# Patient Record
Sex: MALE | Race: WHITE | NOT HISPANIC OR LATINO | Employment: FULL TIME | URBAN - METROPOLITAN AREA
[De-identification: names, ages, dates, MRNs, and addresses within clinical notes are randomized per-mention and may not be internally consistent; named-entity substitution may affect disease eponyms.]

---

## 2023-10-10 ENCOUNTER — TELEPHONE (OUTPATIENT)
Dept: GASTROENTEROLOGY | Facility: CLINIC | Age: 68
End: 2023-10-10
Payer: MEDICARE

## 2024-01-03 ENCOUNTER — ANESTHESIA EVENT (OUTPATIENT)
Dept: GASTROENTEROLOGY | Facility: HOSPITAL | Age: 69
End: 2024-01-03
Payer: MEDICARE

## 2024-01-07 ENCOUNTER — PREP FOR PROCEDURE (OUTPATIENT)
Dept: GASTROENTEROLOGY | Facility: CLINIC | Age: 69
End: 2024-01-07
Payer: MEDICARE

## 2024-01-07 RX ORDER — SODIUM CHLORIDE 9 MG/ML
20 INJECTION, SOLUTION INTRAVENOUS CONTINUOUS
Status: CANCELLED | OUTPATIENT
Start: 2024-01-07

## 2024-01-08 ENCOUNTER — ANESTHESIA (OUTPATIENT)
Dept: GASTROENTEROLOGY | Facility: HOSPITAL | Age: 69
End: 2024-01-08
Payer: MEDICARE

## 2024-01-08 ENCOUNTER — HOSPITAL ENCOUNTER (OUTPATIENT)
Dept: GASTROENTEROLOGY | Facility: HOSPITAL | Age: 69
Discharge: HOME | End: 2024-01-08
Payer: MEDICARE

## 2024-01-08 VITALS
DIASTOLIC BLOOD PRESSURE: 92 MMHG | HEART RATE: 66 BPM | WEIGHT: 136 LBS | OXYGEN SATURATION: 96 % | TEMPERATURE: 97.5 F | RESPIRATION RATE: 16 BRPM | SYSTOLIC BLOOD PRESSURE: 135 MMHG | HEIGHT: 69 IN | BODY MASS INDEX: 20.14 KG/M2

## 2024-01-08 DIAGNOSIS — Z12.11 ENCOUNTER FOR SCREENING FOR MALIGNANT NEOPLASM OF COLON: ICD-10-CM

## 2024-01-08 PROBLEM — I10 HYPERTENSION: Status: ACTIVE | Noted: 2024-01-08

## 2024-01-08 PROBLEM — E78.5 HYPERLIPIDEMIA: Status: ACTIVE | Noted: 2024-01-08

## 2024-01-08 PROBLEM — J44.9 COPD (CHRONIC OBSTRUCTIVE PULMONARY DISEASE) (MULTI): Status: ACTIVE | Noted: 2024-01-08

## 2024-01-08 PROCEDURE — 45380 COLONOSCOPY AND BIOPSY: CPT | Performed by: INTERNAL MEDICINE

## 2024-01-08 PROCEDURE — 88305 TISSUE EXAM BY PATHOLOGIST: CPT | Performed by: PATHOLOGY

## 2024-01-08 PROCEDURE — 3700000001 HC GENERAL ANESTHESIA TIME - INITIAL BASE CHARGE: Performed by: INTERNAL MEDICINE

## 2024-01-08 PROCEDURE — 3700000002 HC GENERAL ANESTHESIA TIME - EACH INCREMENTAL 1 MINUTE: Performed by: INTERNAL MEDICINE

## 2024-01-08 PROCEDURE — 7100000009 HC PHASE TWO TIME - INITIAL BASE CHARGE: Performed by: INTERNAL MEDICINE

## 2024-01-08 PROCEDURE — 2500000004 HC RX 250 GENERAL PHARMACY W/ HCPCS (ALT 636 FOR OP/ED): Performed by: INTERNAL MEDICINE

## 2024-01-08 PROCEDURE — 2500000004 HC RX 250 GENERAL PHARMACY W/ HCPCS (ALT 636 FOR OP/ED)

## 2024-01-08 PROCEDURE — 2500000005 HC RX 250 GENERAL PHARMACY W/O HCPCS

## 2024-01-08 PROCEDURE — 7100000010 HC PHASE TWO TIME - EACH INCREMENTAL 1 MINUTE: Performed by: INTERNAL MEDICINE

## 2024-01-08 PROCEDURE — 0753T DGTZ GLS MCRSCP SLD LEVEL IV: CPT | Mod: TC,SUR,PORLAB,MUE | Performed by: INTERNAL MEDICINE

## 2024-01-08 RX ORDER — LISINOPRIL AND HYDROCHLOROTHIAZIDE 20; 25 MG/1; MG/1
1 TABLET ORAL DAILY
COMMUNITY

## 2024-01-08 RX ORDER — CALCIUM CARBONATE 300MG(750)
400 TABLET,CHEWABLE ORAL DAILY
COMMUNITY
Start: 2021-03-23

## 2024-01-08 RX ORDER — LIDOCAINE HYDROCHLORIDE 20 MG/ML
INJECTION, SOLUTION EPIDURAL; INFILTRATION; INTRACAUDAL; PERINEURAL AS NEEDED
Status: DISCONTINUED | OUTPATIENT
Start: 2024-01-08 | End: 2024-01-08

## 2024-01-08 RX ORDER — PROPOFOL 10 MG/ML
INJECTION, EMULSION INTRAVENOUS AS NEEDED
Status: DISCONTINUED | OUTPATIENT
Start: 2024-01-08 | End: 2024-01-08

## 2024-01-08 RX ORDER — ATORVASTATIN CALCIUM 20 MG/1
20 TABLET, FILM COATED ORAL DAILY
COMMUNITY

## 2024-01-08 RX ORDER — SODIUM CHLORIDE 9 MG/ML
20 INJECTION, SOLUTION INTRAVENOUS CONTINUOUS
Status: DISCONTINUED | OUTPATIENT
Start: 2024-01-08 | End: 2024-01-09 | Stop reason: HOSPADM

## 2024-01-08 RX ADMIN — LIDOCAINE HYDROCHLORIDE 40 MG: 20 INJECTION, SOLUTION EPIDURAL; INFILTRATION; INTRACAUDAL; PERINEURAL at 08:40

## 2024-01-08 RX ADMIN — SODIUM CHLORIDE 20 ML/HR: 9 INJECTION, SOLUTION INTRAVENOUS at 08:05

## 2024-01-08 RX ADMIN — PROPOFOL 200 MG: 10 INJECTION, EMULSION INTRAVENOUS at 08:40

## 2024-01-08 SDOH — HEALTH STABILITY: MENTAL HEALTH: CURRENT SMOKER: 1

## 2024-01-08 ASSESSMENT — PAIN SCALES - GENERAL
PAINLEVEL_OUTOF10: 0 - NO PAIN
PAIN_LEVEL: 0
PAINLEVEL_OUTOF10: 0 - NO PAIN

## 2024-01-08 ASSESSMENT — PAIN - FUNCTIONAL ASSESSMENT
PAIN_FUNCTIONAL_ASSESSMENT: 0-10

## 2024-01-08 ASSESSMENT — COLUMBIA-SUICIDE SEVERITY RATING SCALE - C-SSRS
2. HAVE YOU ACTUALLY HAD ANY THOUGHTS OF KILLING YOURSELF?: NO
1. IN THE PAST MONTH, HAVE YOU WISHED YOU WERE DEAD OR WISHED YOU COULD GO TO SLEEP AND NOT WAKE UP?: NO
6. HAVE YOU EVER DONE ANYTHING, STARTED TO DO ANYTHING, OR PREPARED TO DO ANYTHING TO END YOUR LIFE?: NO

## 2024-01-08 NOTE — ANESTHESIA PREPROCEDURE EVALUATION
Patient: Finesse Banks    Procedure Information       Date/Time: 01/08/24 0830    Scheduled providers: Robbie Valderrama MD    Procedure: COLONOSCOPY    Location: [facility]            Relevant Problems   Anesthesia (within normal limits)      Cardiovascular   (+) Hyperlipidemia   (+) Hypertension      Endocrine (within normal limits)      GI (within normal limits)      /Renal (within normal limits)      Neuro/Psych (within normal limits)      Pulmonary   (+) COPD (chronic obstructive pulmonary disease) (CMS/HCC)      GI/Hepatic (within normal limits)      Hematology (within normal limits)      Musculoskeletal (within normal limits)      Eyes, Ears, Nose, and Throat (within normal limits)      Infectious Disease (within normal limits)       Clinical information reviewed:   Tobacco  Allergies  Meds   Med Hx  Surg Hx   Fam Hx  Soc Hx        NPO Detail:  NPO/Void Status  Carbohydrate Drink Given Prior to Surgery? : N  Date of Last Liquid: 01/08/24  Time of Last Liquid: 0500  Date of Last Solid: 01/06/24  Last Intake Type: GI prep         Physical Exam    Airway  Mallampati: III  TM distance: >3 FB  Neck ROM: full     Cardiovascular   Rhythm: regular  Rate: normal     Dental        Pulmonary   (+) decreased breath sounds     Abdominal - normal exam             Anesthesia Plan    ASA 2     MAC     The patient is a current smoker.  Patient did not smoke on day of procedure.    intravenous induction   Anesthetic plan and risks discussed with patient.    Plan discussed with CRNA.

## 2024-01-08 NOTE — ANESTHESIA POSTPROCEDURE EVALUATION
Patient: Finesse Banks    Procedure Summary       Date: 01/08/24 Room / Location: [facility]    Anesthesia Start: 0833 Anesthesia Stop: 0906    Procedure: COLONOSCOPY Diagnosis: Encounter for screening for malignant neoplasm of colon    Scheduled Providers: Robbie Valderrama MD Responsible Provider: JAMES Markham    Anesthesia Type: MAC ASA Status: 2            Anesthesia Type: MAC    Vitals Value Taken Time   /92 01/08/24 0925   Temp 36.4 °C (97.5 °F) 01/08/24 0925   Pulse 66 01/08/24 0925   Resp 16 01/08/24 0925   SpO2 96 % 01/08/24 0925       Anesthesia Post Evaluation    Patient location during evaluation: PACU  Patient participation: complete - patient participated  Level of consciousness: awake and alert  Pain score: 0  Pain management: adequate  Airway patency: patent  Cardiovascular status: acceptable  Respiratory status: acceptable  Hydration status: acceptable  Postoperative Nausea and Vomiting: none        No notable events documented.

## 2024-01-08 NOTE — LETTER
"January 11, 2024     Finesse Banks  [address] [place] [address]      Dear Mr. Banks:    Below are the results from your recent visit:          One polyp that I removed during your recent colonoscopy was a tubular adenoma on pathology.  This type of polyp is not a cancer, but it is the type of polyp that could grow into a cancer over time.  Any polyps that were removed will not grow into a cancer, but having polyps like this can increase your risk of developing other polyps or eventually a cancer.  Because of that risk I would recommend that you have another colonoscopy in about 5 years to look for other polyps.        The other polyps that I removed during your recent colonoscopy were hyperplastic polyps. These polyps are benign (not cancerous) and they are not considered \"pre-cancerous\" either. They do not appear to increase the risk of colon cancer.        If you have any other questions or concerns please do not hesitate to call me at my office at [phone].        Repeat Colonoscopy Interval: 5 years      Sincerely,        Dr. Robbie Valderrama          Resulted Orders   Surgical Pathology Exam   Result Value Ref Range    Case Report       Surgical Pathology                                Case: [id number]                                  Authorizing Provider:  Robbie Valderrama MD        Collected:           01/08/2024 0854              Ordering Location:     [facility]    Received:            01/08/2024 1027                                     Building                                                                     Pathologist:           Yaya Rosales MD                                                       Specimens:   A) - COLON - TRANSVERSE POLYP                                                                       B) - RECTUM POLYPECTOMY, multiple                                                          FINAL DIAGNOSIS       A.  Transverse colon polyp:  Tubular adenoma    B. " " Rectum polyp:  Hyperplastic polyp              By the signature on this report, the individual or group listed as making the Final Interpretation/Diagnosis certifies that they have reviewed this case.       Gross Description       A: Received in formalin, labeled with the patient's name and hospital number and \"transverse colon\", is a fragment of tan, soft tissue measuring 0.4 x 0.2 x 0.2 cm.  The specimen is submitted in toto in one cassette.  SBS  B: Received in formalin, labeled with the patient's name and hospital number and \"rectum polyp\", are multiple fragments of tan, soft tissue aggregating to 0.7 x 0.3 x 0.2 cm. The specimen is submitted in toto in one cassette.  SBS         "

## 2024-01-08 NOTE — PRE-SEDATION DOCUMENTATION
Patient: Finesse Banks  MRN: [id number]    Pre-sedation Evaluation:  Sedation necessary for: Analgesia  Requesting service: GI    History of Present Illness:     Finesse Banks is a 68 y.o. male with a history of HTN, COPD, HLD, and tobacco use who presents for OPEN ACCESS colonoscopy requested by his PCP to evaluate a positive cologuard.    He has never had a colonoscopy before. He says that his twin sister had colon cancer diagnosed in her 40s.         Past Medical History:   Diagnosis Date    COPD (chronic obstructive pulmonary disease) (CMS/[facility])     Hyperlipidemia     Hypertension        Principle problems:  Patient Active Problem List    Diagnosis Date Noted    COPD (chronic obstructive pulmonary disease) (CMS/[facility]) 01/08/2024    Hyperlipidemia 01/08/2024    Hypertension 01/08/2024     Allergies:  No Known Allergies  PTA/Current Medications:  (Not in a hospital admission)    Current Outpatient Medications   Medication Sig Dispense Refill    atorvastatin (Lipitor) 20 mg tablet Take 1 tablet (20 mg) by mouth once daily.      lisinopriL-hydrochlorothiazide 20-25 mg tablet Take 1 tablet by mouth once daily.      magnesium oxide (Mag-Ox) 400 mg tablet Take 1 tablet (400 mg) by mouth once daily.       Current Facility-Administered Medications   Medication Dose Route Frequency Provider Last Rate Last Admin    sodium chloride 0.9% infusion  20 mL/hr intravenous Continuous Robbie Valderrama MD 20 mL/hr at 01/08/24 0814 Continued by Anesthesia at 01/08/24 0814     Past Surgical History:   has a past surgical history that includes Other surgical history (02/17/2021) and Other surgical history (03/02/2021).    Recent sedation/surgery (24 hours) No    Review of Systems:  Please check all that apply: Asthma        NPO guidelines met: Yes    Physical Exam    Airway  Mallampati: II  Neck ROM: full  Comments: Normal mouth opening.   Cardiovascular   Rhythm: regular  Rate: normal  (-) murmur     Dental    Pulmonary   Breath  sounds clear to auscultation  (-) wheezes       Other findings: Abdomen is soft, nontender, and not distended.    Patient is calm appearing and in no apparent distress.    Patient is awake and alert and oriented x4.      Plan    ASA 2     Moderate   (Sedation medications to be delivered via monitored anesthesia care (MAC).    This evaluation serves as my H&P.    Outpatient medication list and allergies have been reviewed.  Pre-procedure/mellisa procedure antibiotics not needed.    Pre-procedure evaluation completed by physician.    Surgeon has reviewed key risks related to the risk of jesse COVID-19 and if they contract COVID-19 what the risks are.)

## 2024-01-09 NOTE — ADDENDUM NOTE
Encounter addended by: Portia Travis RN on: 1/9/2024 11:01 AM   Actions taken: Contacts section saved, Flowsheet accepted

## 2024-01-11 LAB
LABORATORY COMMENT REPORT: NORMAL
PATH REPORT.FINAL DX SPEC: NORMAL
PATH REPORT.GROSS SPEC: NORMAL
PATH REPORT.TOTAL CANCER: NORMAL

## 2024-03-14 ENCOUNTER — APPOINTMENT (OUTPATIENT)
Dept: UROLOGY | Facility: CLINIC | Age: 69
End: 2024-03-14
Payer: MEDICARE

## 2024-03-25 ENCOUNTER — OFFICE VISIT (OUTPATIENT)
Dept: UROLOGY | Facility: CLINIC | Age: 69
End: 2024-03-25
Payer: MEDICARE

## 2024-03-25 DIAGNOSIS — R97.20 ELEVATED PSA: ICD-10-CM

## 2024-03-25 DIAGNOSIS — N40.2 PROSTATE NODULE: ICD-10-CM

## 2024-03-25 DIAGNOSIS — N40.1 BENIGN PROSTATIC HYPERPLASIA WITH LOWER URINARY TRACT SYMPTOMS, SYMPTOM DETAILS UNSPECIFIED: Primary | ICD-10-CM

## 2024-03-25 LAB
POC BILIRUBIN, URINE: NEGATIVE
POC BLOOD, URINE: NEGATIVE
POC GLUCOSE, URINE: NEGATIVE MG/DL
POC KETONES, URINE: NEGATIVE MG/DL
POC LEUKOCYTES, URINE: NEGATIVE
POC NITRITE,URINE: NEGATIVE
POC PH, URINE: 6.5 PH
POC PROTEIN, URINE: NEGATIVE MG/DL
POC SPECIFIC GRAVITY, URINE: 1.01
POC UROBILINOGEN, URINE: 0.2 EU/DL

## 2024-03-25 PROCEDURE — 1159F MED LIST DOCD IN RCRD: CPT | Performed by: UROLOGY

## 2024-03-25 PROCEDURE — 99204 OFFICE O/P NEW MOD 45 MIN: CPT | Performed by: UROLOGY

## 2024-03-25 PROCEDURE — 81003 URINALYSIS AUTO W/O SCOPE: CPT | Performed by: UROLOGY

## 2024-03-25 NOTE — PROGRESS NOTES
03/25/2024  68-year-old gentleman presents an elevated PSA 26.7 but not dated.  Voiding well    Patient has no nausea, no vomiting, no fever.    Exam: Circumcised, normal penis normal testes    CLEMENT: 2+, 1 cm lateral right nodule    We discussed elevated PSA, repeat PSA  We discussed prostate nodule, possible prostate biopsy.  We discussed benign prostate hypertrophy with mild voiding symptom  All the questions were answered, the patient expressed understanding and agreed to the plan.    Impression  Elevated PSA  Prostate nodule  BPH    Plan  Watch voiding  Repeat PSA now  Appointment in 2 weeks  Possible prostate biopsy.    Chief Complaint   Patient presents with    Elevated PSA     New patient here for elevated PSA. FH: sister had breast and colon cancer. He denies hx of UTI and prostatitis. Voiding well.         Physical Exam     TODAYS LAB RESULTS:    PSA No date given  26.7    POC Glucose, Urine  NEGATIVE mg/dl NEGATIVE   POC Bilirubin, Urine  NEGATIVE NEGATIVE   POC Ketones, Urine  NEGATIVE mg/dl NEGATIVE   POC Specific Gravity, Urine  1.005 - 1.035 1.015   POC Blood, Urine  NEGATIVE NEGATIVE   POC PH, Urine  No Reference Range Established PH 6.5   POC Protein, Urine  NEGATIVE, 30 (1+) mg/dl NEGATIVE   POC Urobilinogen, Urine  0.2, 1.0 EU/DL 0.2   Poc Nitrite, Urine  NEGATIVE NEGATIVE   POC Leukocytes, Urine  NEGATIVE NEGATIVE       ASSESSMENT&PLAN:      IMPRESSIONS:     PSA   No date given  26.7

## 2024-04-18 ENCOUNTER — OFFICE VISIT (OUTPATIENT)
Dept: UROLOGY | Facility: CLINIC | Age: 69
End: 2024-04-18
Payer: MEDICARE

## 2024-04-18 ENCOUNTER — APPOINTMENT (OUTPATIENT)
Dept: LAB | Facility: LAB | Age: 69
End: 2024-04-18
Payer: MEDICARE

## 2024-04-18 ENCOUNTER — LAB (OUTPATIENT)
Dept: LAB | Facility: LAB | Age: 69
End: 2024-04-18
Payer: MEDICARE

## 2024-04-18 DIAGNOSIS — N40.1 BENIGN PROSTATIC HYPERPLASIA WITH LOWER URINARY TRACT SYMPTOMS, SYMPTOM DETAILS UNSPECIFIED: ICD-10-CM

## 2024-04-18 DIAGNOSIS — N40.2 PROSTATE NODULE: ICD-10-CM

## 2024-04-18 DIAGNOSIS — R97.20 ELEVATED PSA: ICD-10-CM

## 2024-04-18 DIAGNOSIS — R97.20 ELEVATED PSA: Primary | ICD-10-CM

## 2024-04-18 LAB — PSA SERPL-MCNC: 31.2 NG/ML

## 2024-04-18 PROCEDURE — 1159F MED LIST DOCD IN RCRD: CPT | Performed by: UROLOGY

## 2024-04-18 PROCEDURE — 99024 POSTOP FOLLOW-UP VISIT: CPT | Performed by: UROLOGY

## 2024-04-18 PROCEDURE — 36415 COLL VENOUS BLD VENIPUNCTURE: CPT

## 2024-04-18 PROCEDURE — 84153 ASSAY OF PSA TOTAL: CPT

## 2024-04-18 NOTE — PROGRESS NOTES
04/18/2024  Patient did not get PSA    Plan:   PSA today   appointment next week  Cancel today's appointment    Chief Complaint   Patient presents with    Elevated PSA     Patient here today for follow up PSA.  PSA    3/25/2024   26.7  Patient did  not have his PSA  blood work done, he was under the impression that he would have his blood work done today at the office.          Physical Exam     TODAYS LAB RESULTS:      ASSESSMENT&PLAN:      IMPRESSIONS:      03/25/2024  68-year-old gentleman presents an elevated PSA 26.7 but not dated.  Voiding well     Patient has no nausea, no vomiting, no fever.     Exam: Circumcised, normal penis normal testes     CLEMENT: 2+, 1 cm lateral right nodule     We discussed elevated PSA, repeat PSA  We discussed prostate nodule, possible prostate biopsy.  We discussed benign prostate hypertrophy with mild voiding symptom  All the questions were answered, the patient expressed understanding and agreed to the plan.     Impression  Elevated PSA  Prostate nodule  BPH     Plan  Watch voiding  Repeat PSA now  Appointment in 2 weeks  Possible prostate biopsy.          Chief Complaint   Patient presents with    Elevated PSA       New patient here for elevated PSA. FH: sister had breast and colon cancer. He denies hx of UTI and prostatitis. Voiding well.          Physical Exam      TODAYS LAB RESULTS:     PSA No date given  26.7     POC Glucose, Urine  NEGATIVE mg/dl NEGATIVE   POC Bilirubin, Urine  NEGATIVE NEGATIVE   POC Ketones, Urine  NEGATIVE mg/dl NEGATIVE   POC Specific Gravity, Urine  1.005 - 1.035 1.015   POC Blood, Urine  NEGATIVE NEGATIVE   POC PH, Urine  No Reference Range Established PH 6.5   POC Protein, Urine  NEGATIVE, 30 (1+) mg/dl NEGATIVE   POC Urobilinogen, Urine  0.2, 1.0 EU/DL 0.2   Poc Nitrite, Urine  NEGATIVE NEGATIVE   POC Leukocytes, Urine  NEGATIVE NEGATIVE         ASSESSMENT&PLAN:        IMPRESSIONS:      PSA   No date given  26.7

## 2024-05-06 ENCOUNTER — OFFICE VISIT (OUTPATIENT)
Dept: UROLOGY | Facility: CLINIC | Age: 69
End: 2024-05-06
Payer: MEDICARE

## 2024-05-06 DIAGNOSIS — R97.20 ELEVATED PSA: Primary | ICD-10-CM

## 2024-05-06 DIAGNOSIS — N40.0 BENIGN PROSTATIC HYPERPLASIA, UNSPECIFIED WHETHER LOWER URINARY TRACT SYMPTOMS PRESENT: ICD-10-CM

## 2024-05-06 LAB
POC BILIRUBIN, URINE: NEGATIVE
POC BLOOD, URINE: NEGATIVE
POC GLUCOSE, URINE: NEGATIVE MG/DL
POC KETONES, URINE: ABNORMAL MG/DL
POC LEUKOCYTES, URINE: NEGATIVE
POC NITRITE,URINE: NEGATIVE
POC PH, URINE: 7 PH
POC PROTEIN, URINE: NEGATIVE MG/DL
POC SPECIFIC GRAVITY, URINE: 1.02
POC UROBILINOGEN, URINE: 0.2 EU/DL

## 2024-05-06 PROCEDURE — 81002 URINALYSIS NONAUTO W/O SCOPE: CPT | Performed by: UROLOGY

## 2024-05-06 PROCEDURE — 1159F MED LIST DOCD IN RCRD: CPT | Performed by: UROLOGY

## 2024-05-06 PROCEDURE — 99214 OFFICE O/P EST MOD 30 MIN: CPT | Performed by: UROLOGY

## 2024-05-06 RX ORDER — CIPROFLOXACIN 500 MG/1
500 TABLET ORAL 2 TIMES DAILY
Qty: 6 TABLET | Refills: 0 | Status: SHIPPED | OUTPATIENT
Start: 2024-05-06 | End: 2024-05-09

## 2024-05-06 NOTE — PROGRESS NOTES
05/06/2024  Further rising PSA 31.2    We talked about the meaning of PSA elevation, possible prostate cancer, we discussed transrectal ultrasound-guided prostate biopsy. The risk and the benefits were discussed. Patient agreed to proceed a biopsy.    Impression  Elevated PSA  Prostate nodule  BPH    Plan:  Transrectal ultrasound-guided prostate biopsy;  Cipro 500 mg twice a day ×3 days started day before biopsy;  Fleets enema used 2 hours before biopsy;  Return to office 2 weeks after biopsy    Chief Complaint   Patient presents with    Benign Prostatic Hypertrophy     Patient here for a follow up due to BPH and Elevated PSA.    PSA 4/18/2024  31.20    Last Visit Plan:  Watch voiding  Repeat PSA now  Appointment in 2 weeks  Possible prostate biopsy.          Physical Exam     TODAYS LAB RESULTS:    Component  Ref Range & Units 09:45 1 mo ago   POC Glucose, Urine  NEGATIVE mg/dl NEGATIVE NEGATIVE   POC Bilirubin, Urine  NEGATIVE NEGATIVE NEGATIVE   POC Ketones, Urine  NEGATIVE mg/dl TRACE Abnormal  NEGATIVE   POC Specific Gravity, Urine  1.005 - 1.035 1.020 1.015   POC Blood, Urine  NEGATIVE NEGATIVE NEGATIVE   POC PH, Urine  No Reference Range Established PH 7.0 6.5   POC Protein, Urine  NEGATIVE, 30 (1+) mg/dl NEGATIVE NEGATIVE   POC Urobilinogen, Urine  0.2, 1.0 EU/DL 0.2 0.2   Poc Nitrite, Urine  NEGATIVE NEGATIVE NEGATIVE   POC Leukocytes, Urine  NEGATIVE NEGATIVE NEGATIVE     ASSESSMENT&PLAN:      IMPRESSIONS:     04/18/2024  Patient did not get PSA     Plan:   PSA today   appointment next week  Cancel today's appointment          Chief Complaint   Patient presents with    Elevated PSA       Patient here today for follow up PSA.  PSA    3/25/2024   26.7  Patient did  not have his PSA  blood work done, he was under the impression that he would have his blood work done today at the office.            Physical Exam      TODAYS LAB RESULTS:        ASSESSMENT&PLAN:        IMPRESSIONS:        03/25/2024  68-year-old  gentleman presents an elevated PSA 26.7 but not dated.  Voiding well     Patient has no nausea, no vomiting, no fever.     Exam: Circumcised, normal penis normal testes     CLEMENT: 2+, 1 cm lateral right nodule     We discussed elevated PSA, repeat PSA  We discussed prostate nodule, possible prostate biopsy.  We discussed benign prostate hypertrophy with mild voiding symptom  All the questions were answered, the patient expressed understanding and agreed to the plan.     Impression  Elevated PSA  Prostate nodule  BPH     Plan  Watch voiding  Repeat PSA now  Appointment in 2 weeks  Possible prostate biopsy.             Chief Complaint   Patient presents with    Elevated PSA       New patient here for elevated PSA. FH: sister had breast and colon cancer. He denies hx of UTI and prostatitis. Voiding well.          Physical Exam      TODAYS LAB RESULTS:     PSA No date given  26.7     POC Glucose, Urine  NEGATIVE mg/dl NEGATIVE   POC Bilirubin, Urine  NEGATIVE NEGATIVE   POC Ketones, Urine  NEGATIVE mg/dl NEGATIVE   POC Specific Gravity, Urine  1.005 - 1.035 1.015   POC Blood, Urine  NEGATIVE NEGATIVE   POC PH, Urine  No Reference Range Established PH 6.5   POC Protein, Urine  NEGATIVE, 30 (1+) mg/dl NEGATIVE   POC Urobilinogen, Urine  0.2, 1.0 EU/DL 0.2   Poc Nitrite, Urine  NEGATIVE NEGATIVE   POC Leukocytes, Urine  NEGATIVE NEGATIVE         ASSESSMENT&PLAN:        IMPRESSIONS:      PSA   4/18/2024 31.2  No date given  26.7

## 2024-06-13 ENCOUNTER — APPOINTMENT (OUTPATIENT)
Dept: UROLOGY | Facility: CLINIC | Age: 69
End: 2024-06-13
Payer: MEDICARE

## 2024-06-13 VITALS — DIASTOLIC BLOOD PRESSURE: 83 MMHG | SYSTOLIC BLOOD PRESSURE: 146 MMHG

## 2024-06-13 DIAGNOSIS — R97.20 ELEVATED PSA: Primary | ICD-10-CM

## 2024-06-13 PROCEDURE — 55700 PR PROSTATE NEEDLE BIOPSY ANY APPROACH: CPT | Performed by: UROLOGY

## 2024-06-13 PROCEDURE — 76942 ECHO GUIDE FOR BIOPSY: CPT | Performed by: UROLOGY

## 2024-06-13 RX ORDER — LIDOCAINE HYDROCHLORIDE 20 MG/ML
1 JELLY TOPICAL ONCE
OUTPATIENT
Start: 2024-06-13 | End: 2024-06-13

## 2024-06-13 NOTE — PROGRESS NOTES
06/13/2024  Patient was positioned left side down decubitus position.     10 cc 1% lidocaine was injected locally under ultrasound guidance.    Prostate ultrasound was performed and volume was measured.   then prostate needle biopsy was performed and following specimen was obtained:  Left lateral apex, left lateral mid, left lateral base, left apex, left mid, left base;  Right apex, right mid, right base, right lateral apex, right lateral mid, right lateral base    Patient tolerated the procedure well and with minimal bleeding.    Pain level  0/10 before  2/10 after    We discussed post biopsy instructions  All the questions were answered, the patient expressed understanding and agreed to the plan.    Impression  Elevated PSA  Prostate nodule  BPH     Plan:  Await pathology  Appointment in 2 weeks    Chief Complaint   Patient presents with    Elevated PSA     Patient here for TRUS prostate biopsy.         Physical Exam     TODAYS LAB RESULTS:      ASSESSMENT&PLAN:      IMPRESSIONS:     05/06/2024  Further rising PSA 31.2     We talked about the meaning of PSA elevation, possible prostate cancer, we discussed transrectal ultrasound-guided prostate biopsy. The risk and the benefits were discussed. Patient agreed to proceed a biopsy.     Impression  Elevated PSA  Prostate nodule  BPH     Plan:  Transrectal ultrasound-guided prostate biopsy;  Cipro 500 mg twice a day ×3 days started day before biopsy;  Fleets enema used 2 hours before biopsy;  Return to office 2 weeks after biopsy          Chief Complaint   Patient presents with    Benign Prostatic Hypertrophy       Patient here for a follow up due to BPH and Elevated PSA.     PSA 4/18/2024  31.20     Last Visit Plan:  Watch voiding  Repeat PSA now  Appointment in 2 weeks  Possible prostate biopsy.            Physical Exam      TODAYS LAB RESULTS:     Component  Ref Range & Units 09:45 1 mo ago   POC Glucose, Urine  NEGATIVE mg/dl NEGATIVE NEGATIVE   POC Bilirubin,  Urine  NEGATIVE NEGATIVE NEGATIVE   POC Ketones, Urine  NEGATIVE mg/dl TRACE Abnormal  NEGATIVE   POC Specific Gravity, Urine  1.005 - 1.035 1.020 1.015   POC Blood, Urine  NEGATIVE NEGATIVE NEGATIVE   POC PH, Urine  No Reference Range Established PH 7.0 6.5   POC Protein, Urine  NEGATIVE, 30 (1+) mg/dl NEGATIVE NEGATIVE   POC Urobilinogen, Urine  0.2, 1.0 EU/DL 0.2 0.2   Poc Nitrite, Urine  NEGATIVE NEGATIVE NEGATIVE   POC Leukocytes, Urine  NEGATIVE NEGATIVE NEGATIVE      ASSESSMENT&PLAN:        IMPRESSIONS:      04/18/2024  Patient did not get PSA     Plan:   PSA today   appointment next week  Cancel today's appointment             Chief Complaint   Patient presents with    Elevated PSA       Patient here today for follow up PSA.  PSA    3/25/2024   26.7  Patient did  not have his PSA  blood work done, he was under the impression that he would have his blood work done today at the office.            Physical Exam      TODAYS LAB RESULTS:        ASSESSMENT&PLAN:        IMPRESSIONS:        03/25/2024  68-year-old gentleman presents an elevated PSA 26.7 but not dated.  Voiding well     Patient has no nausea, no vomiting, no fever.     Exam: Circumcised, normal penis normal testes     CLEMENT: 2+, 1 cm lateral right nodule     We discussed elevated PSA, repeat PSA  We discussed prostate nodule, possible prostate biopsy.  We discussed benign prostate hypertrophy with mild voiding symptom  All the questions were answered, the patient expressed understanding and agreed to the plan.     Impression  Elevated PSA  Prostate nodule  BPH     Plan  Watch voiding  Repeat PSA now  Appointment in 2 weeks  Possible prostate biopsy.             Chief Complaint   Patient presents with    Elevated PSA       New patient here for elevated PSA. FH: sister had breast and colon cancer. He denies hx of UTI and prostatitis. Voiding well.          Physical Exam      TODAYS LAB RESULTS:     PSA No date given  26.7     POC Glucose, Urine  NEGATIVE  mg/dl NEGATIVE   POC Bilirubin, Urine  NEGATIVE NEGATIVE   POC Ketones, Urine  NEGATIVE mg/dl NEGATIVE   POC Specific Gravity, Urine  1.005 - 1.035 1.015   POC Blood, Urine  NEGATIVE NEGATIVE   POC PH, Urine  No Reference Range Established PH 6.5   POC Protein, Urine  NEGATIVE, 30 (1+) mg/dl NEGATIVE   POC Urobilinogen, Urine  0.2, 1.0 EU/DL 0.2   Poc Nitrite, Urine  NEGATIVE NEGATIVE   POC Leukocytes, Urine  NEGATIVE NEGATIVE         ASSESSMENT&PLAN:        IMPRESSIONS:      PSA   4/18/2024 31.2  No date given  26.7

## 2024-06-24 LAB
LABORATORY COMMENT REPORT: NORMAL
PATH REPORT.FINAL DX SPEC: NORMAL
PATH REPORT.GROSS SPEC: NORMAL
PATH REPORT.RELEVANT HX SPEC: NORMAL
PATH REPORT.TOTAL CANCER: NORMAL

## 2024-07-08 ENCOUNTER — APPOINTMENT (OUTPATIENT)
Dept: UROLOGY | Facility: CLINIC | Age: 69
End: 2024-07-08
Payer: MEDICARE

## 2024-07-08 DIAGNOSIS — R97.20 ELEVATED PSA: ICD-10-CM

## 2024-07-08 DIAGNOSIS — C61 PROSTATE CANCER (MULTI): Primary | ICD-10-CM

## 2024-07-08 PROCEDURE — 99214 OFFICE O/P EST MOD 30 MIN: CPT | Performed by: UROLOGY

## 2024-07-08 PROCEDURE — 1159F MED LIST DOCD IN RCRD: CPT | Performed by: UROLOGY

## 2024-07-08 NOTE — PROGRESS NOTES
07/08/2024  Status post prostate biopsy.  Did well after biopsy    Prostate pathology: Luis's 3+4 = 7 prostate adenocarcinoma.  7/12 and 45%.      We discussed prostate cancer treatment options, includes #1 watchful waiting, #2 IM RT, #3 brachy therapy, #4 radical prostatectomy, #5 hormonal treatment etc. risk and benefit for each and patient expressed understanding and will make decisions.      Impression  Prostate cancer Luis's 3+4 = 7, 6//2024  Elevated PSA  Prostate nodule  BPH     Plan:  CT pelvis for [place] prostate cancer  BMP and creatinine  Bone scan for [place] prostate cancer  Appointment in 2 weeks    I spent 25 minutes with this patient. Greater than 50% of this time was spent in counseling and/or coordination of care      Chief Complaint   Patient presents with    Elevated PSA     Patient coming in today for follow up on his biopsy results.  Patient denies any issues or concerns other then wanting to know biopsy results.    Last Visit Plan:  Await pathology  Appointment in 2 weeks          Physical Exam     TODAYS LAB RESULTS:    No Urine sample given    ASSESSMENT&PLAN:      IMPRESSIONS:     Surgical Biopsy  6/13/2024    A. Prostate, right base, biopsy:  -- Benign prostatic tissue     B. Prostate, right lateral base, biopsy:  -- Prostatic adenocarcinoma, Luis score 3+4=7 (Grade group 2), involving one of one core and approximately 45% of the specimen     Note: [place] pattern 4 comprises less than 5% of the overall tumor volume. Cribriform growth is not identified.     C. Prostate, right mid, biopsy:  -- Benign prostatic tissue     D. Prostate, right lateral mid, biopsy:  -- Prostatic adenocarcinoma, Luis score 3+3=6 (Grade group 1), involving one of two core fragments and approximately 15% of the specimen     E. Prostate, right apex, biopsy:  -- Atypical small acinar proliferation (ASAP)     F. Prostate, right lateral apex, biopsy:  -- Prostatic adenocarcinoma, [place] score 3+3=6  (Grade group 1), involving one of one cores and approximately 5% of the specimen     G. Prostate, left base, biopsy:  -- Prostatic adenocarcinoma, [place] score 3+4=7 (Grade group 2), involving one of one core and approximately 45% of the specimen     Note: [place] pattern 4 comprises approximately 5% of the overall tumor volume. Cribriform growth is not identified.     H. Prostate, left lateral base, biopsy:  -- Prostatic adenocarcinoma, [place] score 3+4=7 (Grade group 2), involving one of two cores and approximately 10% of the specimen     Note: [place] pattern 4 comprises less than 5% of the overall tumor volume. Cribriform growth is not identified.     I. Prostate, left mid, biopsy:  -- Benign prostatic tissue     J. Prostate, left lateral mid, biopsy:  -- Prostatic adenocarcinoma, [place] score 3+3=6 (Grade group 1), involving one of one core and approximately 15% of the specimen     K. Prostate, left apex, biopsy:  -- Atypical small acinar proliferation (ASAP), suspicious for adenocarcinoma     L. Prostate, left lateral apex, biopsy:  -- Prostatic adenocarcinoma, [place] score 3+4=7 (Grade group 2), involving one of one core and approximately 25% of the specimen        06/13/2024  Patient was positioned left side down decubitus position.      10 cc 1% lidocaine was injected locally under ultrasound guidance.     Prostate ultrasound was performed and volume was measured.   then prostate needle biopsy was performed and following specimen was obtained:  Left lateral apex, left lateral mid, left lateral base, left apex, left mid, left base;  Right apex, right mid, right base, right lateral apex, right lateral mid, right lateral base     Patient tolerated the procedure well and with minimal bleeding.     Pain level  0/10 before  2/10 after     We discussed post biopsy instructions  All the questions were answered, the patient expressed understanding and agreed to the plan.     Impression  Elevated PSA  Prostate  nodule  BPH     Plan:  Await pathology  Appointment in 2 weeks          Chief Complaint   Patient presents with    Elevated PSA       Patient here for TRUS prostate biopsy.          Physical Exam      TODAYS LAB RESULTS:        ASSESSMENT&PLAN:        IMPRESSIONS:      05/06/2024  Further rising PSA 31.2     We talked about the meaning of PSA elevation, possible prostate cancer, we discussed transrectal ultrasound-guided prostate biopsy. The risk and the benefits were discussed. Patient agreed to proceed a biopsy.     Impression  Elevated PSA  Prostate nodule  BPH     Plan:  Transrectal ultrasound-guided prostate biopsy;  Cipro 500 mg twice a day ×3 days started day before biopsy;  Fleets enema used 2 hours before biopsy;  Return to office 2 weeks after biopsy             Chief Complaint   Patient presents with    Benign Prostatic Hypertrophy       Patient here for a follow up due to BPH and Elevated PSA.     PSA 4/18/2024  31.20     Last Visit Plan:  Watch voiding  Repeat PSA now  Appointment in 2 weeks  Possible prostate biopsy.            Physical Exam      TODAYS LAB RESULTS:     Component  Ref Range & Units 09:45 1 mo ago   POC Glucose, Urine  NEGATIVE mg/dl NEGATIVE NEGATIVE   POC Bilirubin, Urine  NEGATIVE NEGATIVE NEGATIVE   POC Ketones, Urine  NEGATIVE mg/dl TRACE Abnormal  NEGATIVE   POC Specific Gravity, Urine  1.005 - 1.035 1.020 1.015   POC Blood, Urine  NEGATIVE NEGATIVE NEGATIVE   POC PH, Urine  No Reference Range Established PH 7.0 6.5   POC Protein, Urine  NEGATIVE, 30 (1+) mg/dl NEGATIVE NEGATIVE   POC Urobilinogen, Urine  0.2, 1.0 EU/DL 0.2 0.2   Poc Nitrite, Urine  NEGATIVE NEGATIVE NEGATIVE   POC Leukocytes, Urine  NEGATIVE NEGATIVE NEGATIVE      ASSESSMENT&PLAN:        IMPRESSIONS:      04/18/2024  Patient did not get PSA     Plan:   PSA today   appointment next week  Cancel today's appointment             Chief Complaint   Patient presents with    Elevated PSA       Patient here today for  follow up PSA.  PSA    3/25/2024   26.7  Patient did  not have his PSA  blood work done, he was under the impression that he would have his blood work done today at the office.            Physical Exam      TODAYS LAB RESULTS:        ASSESSMENT&PLAN:        IMPRESSIONS:        03/25/2024  68-year-old gentleman presents an elevated PSA 26.7 but not dated.  Voiding well     Patient has no nausea, no vomiting, no fever.     Exam: Circumcised, normal penis normal testes     CLEMENT: 2+, 1 cm lateral right nodule     We discussed elevated PSA, repeat PSA  We discussed prostate nodule, possible prostate biopsy.  We discussed benign prostate hypertrophy with mild voiding symptom  All the questions were answered, the patient expressed understanding and agreed to the plan.     Impression  Elevated PSA  Prostate nodule  BPH     Plan  Watch voiding  Repeat PSA now  Appointment in 2 weeks  Possible prostate biopsy.             Chief Complaint   Patient presents with    Elevated PSA       New patient here for elevated PSA. FH: sister had breast and colon cancer. He denies hx of UTI and prostatitis. Voiding well.          Physical Exam      TODAYS LAB RESULTS:     PSA No date given  26.7     POC Glucose, Urine  NEGATIVE mg/dl NEGATIVE   POC Bilirubin, Urine  NEGATIVE NEGATIVE   POC Ketones, Urine  NEGATIVE mg/dl NEGATIVE   POC Specific Gravity, Urine  1.005 - 1.035 1.015   POC Blood, Urine  NEGATIVE NEGATIVE   POC PH, Urine  No Reference Range Established PH 6.5   POC Protein, Urine  NEGATIVE, 30 (1+) mg/dl NEGATIVE   POC Urobilinogen, Urine  0.2, 1.0 EU/DL 0.2   Poc Nitrite, Urine  NEGATIVE NEGATIVE   POC Leukocytes, Urine  NEGATIVE NEGATIVE         ASSESSMENT&PLAN:        IMPRESSIONS:      PSA   4/18/2024 31.2  No date given  26.7    Surgery  6/13/2024 Luis's 3+4 = 7 prostate adenocarcinoma